# Patient Record
Sex: MALE | Race: WHITE | Employment: UNEMPLOYED | URBAN - NONMETROPOLITAN AREA
[De-identification: names, ages, dates, MRNs, and addresses within clinical notes are randomized per-mention and may not be internally consistent; named-entity substitution may affect disease eponyms.]

---

## 2021-10-12 PROCEDURE — U0004 COV-19 TEST NON-CDC HGH THRU: HCPCS | Performed by: NURSE PRACTITIONER

## 2021-10-21 ENCOUNTER — HOSPITAL ENCOUNTER (EMERGENCY)
Age: 51
Discharge: HOME | End: 2021-10-21
Attending: EMERGENCY MEDICINE
Payer: COMMERCIAL

## 2021-10-21 ENCOUNTER — APPOINTMENT (OUTPATIENT)
Dept: GENERAL RADIOLOGY | Age: 51
End: 2021-10-21
Payer: COMMERCIAL

## 2021-10-21 VITALS
BODY MASS INDEX: 26.32 KG/M2 | HEIGHT: 71 IN | RESPIRATION RATE: 17 BRPM | DIASTOLIC BLOOD PRESSURE: 78 MMHG | SYSTOLIC BLOOD PRESSURE: 118 MMHG | HEART RATE: 92 BPM | WEIGHT: 188 LBS | TEMPERATURE: 99.6 F | OXYGEN SATURATION: 97 %

## 2021-10-21 DIAGNOSIS — U07.1 COVID-19: Primary | ICD-10-CM

## 2021-10-21 LAB
ALBUMIN SERPL-MCNC: 3.7 G/DL (ref 3.5–5.2)
ALP BLD-CCNC: 77 U/L (ref 40–130)
ALT SERPL-CCNC: 71 U/L (ref 5–41)
ANION GAP SERPL CALCULATED.3IONS-SCNC: 9 MMOL/L (ref 7–19)
AST SERPL-CCNC: 42 U/L (ref 5–40)
BASOPHILS ABSOLUTE: 0 K/UL (ref 0–0.2)
BASOPHILS RELATIVE PERCENT: 0 % (ref 0–1)
BILIRUB SERPL-MCNC: 0.3 MG/DL (ref 0.2–1.2)
BUN BLDV-MCNC: 15 MG/DL (ref 6–20)
CALCIUM SERPL-MCNC: 8.9 MG/DL (ref 8.6–10)
CHLORIDE BLD-SCNC: 100 MMOL/L (ref 98–111)
CO2: 27 MMOL/L (ref 22–29)
CREAT SERPL-MCNC: 0.9 MG/DL (ref 0.5–1.2)
EOSINOPHILS ABSOLUTE: 0 K/UL (ref 0–0.6)
EOSINOPHILS RELATIVE PERCENT: 0 % (ref 0–5)
GFR AFRICAN AMERICAN: >59
GFR NON-AFRICAN AMERICAN: >60
GLUCOSE BLD-MCNC: 109 MG/DL (ref 74–109)
HCT VFR BLD CALC: 42.7 % (ref 42–52)
HEMOGLOBIN: 13.9 G/DL (ref 14–18)
IMMATURE GRANULOCYTES #: 0 K/UL
LYMPHOCYTES ABSOLUTE: 1.3 K/UL (ref 1.1–4.5)
LYMPHOCYTES RELATIVE PERCENT: 19.7 % (ref 20–40)
MCH RBC QN AUTO: 30.8 PG (ref 27–31)
MCHC RBC AUTO-ENTMCNC: 32.6 G/DL (ref 33–37)
MCV RBC AUTO: 94.7 FL (ref 80–94)
MONOCYTES ABSOLUTE: 0.4 K/UL (ref 0–0.9)
MONOCYTES RELATIVE PERCENT: 6.2 % (ref 0–10)
NEUTROPHILS ABSOLUTE: 4.9 K/UL (ref 1.5–7.5)
NEUTROPHILS RELATIVE PERCENT: 73.6 % (ref 50–65)
PDW BLD-RTO: 12.4 % (ref 11.5–14.5)
PLATELET # BLD: 151 K/UL (ref 130–400)
PMV BLD AUTO: 9.9 FL (ref 9.4–12.4)
POTASSIUM SERPL-SCNC: 3.7 MMOL/L (ref 3.5–5)
RBC # BLD: 4.51 M/UL (ref 4.7–6.1)
SODIUM BLD-SCNC: 136 MMOL/L (ref 136–145)
TOTAL PROTEIN: 6.7 G/DL (ref 6.6–8.7)
TROPONIN: <0.01 NG/ML (ref 0–0.03)
WBC # BLD: 6.6 K/UL (ref 4.8–10.8)

## 2021-10-21 PROCEDURE — 2500000003 HC RX 250 WO HCPCS: Performed by: EMERGENCY MEDICINE

## 2021-10-21 PROCEDURE — 80053 COMPREHEN METABOLIC PANEL: CPT

## 2021-10-21 PROCEDURE — 93005 ELECTROCARDIOGRAM TRACING: CPT

## 2021-10-21 PROCEDURE — 71045 X-RAY EXAM CHEST 1 VIEW: CPT

## 2021-10-21 PROCEDURE — 2580000003 HC RX 258: Performed by: EMERGENCY MEDICINE

## 2021-10-21 PROCEDURE — 96365 THER/PROPH/DIAG IV INF INIT: CPT

## 2021-10-21 PROCEDURE — 36415 COLL VENOUS BLD VENIPUNCTURE: CPT

## 2021-10-21 PROCEDURE — 99282 EMERGENCY DEPT VISIT SF MDM: CPT

## 2021-10-21 PROCEDURE — 85025 COMPLETE CBC W/AUTO DIFF WBC: CPT

## 2021-10-21 PROCEDURE — 84484 ASSAY OF TROPONIN QUANT: CPT

## 2021-10-21 RX ORDER — ALBUTEROL SULFATE 90 UG/1
2 AEROSOL, METERED RESPIRATORY (INHALATION) 4 TIMES DAILY PRN
Qty: 18 G | Refills: 0 | Status: SHIPPED | OUTPATIENT
Start: 2021-10-21

## 2021-10-21 RX ORDER — BENZONATATE 100 MG/1
100 CAPSULE ORAL 3 TIMES DAILY PRN
Qty: 30 CAPSULE | Refills: 0 | Status: SHIPPED | OUTPATIENT
Start: 2021-10-21 | End: 2021-10-28

## 2021-10-21 RX ADMIN — IMDEVIMAB: 1332 INJECTION, SOLUTION, CONCENTRATE INTRAVENOUS at 09:29

## 2021-10-21 ASSESSMENT — ENCOUNTER SYMPTOMS
BACK PAIN: 0
SHORTNESS OF BREATH: 1
SORE THROAT: 0
VOMITING: 0
RHINORRHEA: 0
COUGH: 1
NAUSEA: 0
ABDOMINAL PAIN: 0
DIARRHEA: 0

## 2021-10-21 ASSESSMENT — PAIN DESCRIPTION - LOCATION: LOCATION: HEAD

## 2021-10-21 ASSESSMENT — PAIN SCALES - GENERAL: PAINLEVEL_OUTOF10: 3

## 2021-10-21 ASSESSMENT — PAIN DESCRIPTION - DESCRIPTORS: DESCRIPTORS: PATIENT UNABLE TO DESCRIBE

## 2021-10-21 NOTE — ED NOTES
Bed: 06  Expected date:   Expected time:   Means of arrival:   Comments:  Terminal      Reno Boyle  10/21/21 0759

## 2021-10-21 NOTE — ED PROVIDER NOTES
[facility] EMERGENCY DEPT  eMERGENCY dEPARTMENT eNCOUnter      Pt Name: Giovanna Steele  MRN: [id number]  [place] 1970  Date of evaluation: 10/21/2021  Provider: Zehra Corona MD    CHIEF COMPLAINT       Chief Complaint   Patient presents with    Positive For Covid-19     since last Tues    Shortness of Breath         HISTORY OF PRESENT ILLNESS   (Location/Symptom, Timing/Onset,Context/Setting, Quality, Duration, Modifying Factors, Severity)  Note limiting factors. Giovanna Steele is a 49 y.o. male who presents to the emergency department for COVID-19. Patient is on day 9 of symptoms and tested positive on October 12. He initially developed a sore throat and had a live telehealth visit and was placed on some cough medicine and steroid. He states overall he been doing well and over the last day has felt his cough is worsened and felt little short of breath. He has a home pulse oximeter and it was dropping into the low 90s and once he saw 89. He denies any chest pain. He is otherwise healthy. HPI    NursingNotes were reviewed. REVIEW OF SYSTEMS    (2-9 systems for level 4, 10 or more for level 5)     Review of Systems   Constitutional: Positive for fatigue. Negative for chills and fever. HENT: Negative for rhinorrhea and sore throat. Respiratory: Positive for cough and shortness of breath. Cardiovascular: Negative for chest pain and leg swelling. Gastrointestinal: Negative for abdominal pain, diarrhea, nausea and vomiting. Genitourinary: Negative for dysuria and frequency. Musculoskeletal: Negative for back pain and neck pain. Neurological: Negative for dizziness and headaches. All other systems reviewed and are negative. PAST MEDICALHISTORY   History reviewed. No pertinent past medical history.       SURGICAL HISTORY       Past Surgical History:   Procedure Laterality Date    ANTERIOR CRUCIATE LIGAMENT REPAIR Right          CURRENT MEDICATIONS     Previous Medications PREDNISONE PO    Take by mouth pack       ALLERGIES     Patient has no allergy information on record. FAMILY HISTORY     History reviewed. No pertinent family history. SOCIAL HISTORY       Social History     Socioeconomic History    Marital status:      Spouse name: None    Number of children: None    Years of education: None    Highest education level: None   Occupational History    None   Tobacco Use    Smoking status: Never Smoker    Smokeless tobacco: Never Used   Substance and Sexual Activity    Alcohol use: Yes     Comment: rare    Drug use: Not Currently    Sexual activity: None   Other Topics Concern    None   Social History Narrative    None     Social Determinants of Health     Financial Resource Strain:     Difficulty of Paying Living Expenses:    Food Insecurity:     Worried About Running Out of Food in the Last Year:     Ran Out of Food in the Last Year:    Transportation Needs:     Lack of Transportation (Medical):  Lack of Transportation (Non-Medical):    Physical Activity:     Days of Exercise per Week:     Minutes of Exercise per Session:    Stress:     Feeling of Stress :    Social Connections:     Frequency of Communication with Friends and Family:     Frequency of Social Gatherings with Friends and Family:     Attends Rastafarian Services:     Active Member of Clubs or Organizations:     Attends Club or Organization Meetings:     Marital Status:    Intimate Partner Violence:     Fear of Current or Ex-Partner:     Emotionally Abused:     Physically Abused:     Sexually Abused:        SCREENINGS             PHYSICAL EXAM    (up to 7 for level 4, 8 or more for level 5)     ED Triage Vitals [10/21/21 0807]   BP Temp Temp Source Pulse Resp SpO2 Height Weight   118/78 99.6 °F (37.6 °C) Oral 92 17 97 % 5' 11\" (1.803 m) 188 lb (85.3 kg)       Physical Exam  Vitals and nursing note reviewed. Constitutional:       Appearance: He is well-developed.  He is not ill-appearing or diaphoretic. Comments: Mild dry cough while we are talking, speaking in full sentences in no distress   HENT:      Head: Normocephalic and atraumatic. Nose: Nose normal.      Mouth/Throat:      Mouth: Mucous membranes are moist.   Eyes:      Conjunctiva/sclera: Conjunctivae normal.   Neck:      Trachea: No tracheal deviation. Cardiovascular:      Rate and Rhythm: Normal rate and regular rhythm. Heart sounds: Normal heart sounds. No murmur heard. Pulmonary:      Breath sounds: Normal breath sounds. No wheezing or rales. Abdominal:      Palpations: Abdomen is soft. There is no mass. Tenderness: There is no abdominal tenderness. Musculoskeletal:         General: Normal range of motion. Cervical back: Normal range of motion and neck supple. Right lower leg: No edema. Left lower leg: No edema. Skin:     General: Skin is warm and dry. Neurological:      Mental Status: He is alert and oriented to person, place, and time. DIAGNOSTIC RESULTS     EKG: All EKG's areinterpreted by the Emergency Department Physician who either signs or Co-signs this chart in the absence of a cardiologist.    89 normal sinus rhythm no obvious ST changes nondiagnostic EKG    RADIOLOGY:  Non-plain film images such as CT, Ultrasound and MRI are read by the radiologist. Plain radiographic images are visualized and preliminarily interpreted bythe emergency physician with the below findings:      XR CHEST PORTABLE   Final Result   Impression:   Findings are most concerning for infection.    Signed by Dr Cosme Zuniga:  Labs Reviewed   CBC WITH AUTO DIFFERENTIAL - Abnormal; Notable for the following components:       Result Value    RBC 4.51 (*)     Hemoglobin 13.9 (*)     MCV 94.7 (*)     MCHC 32.6 (*)     Neutrophils % 73.6 (*)     Lymphocytes % 19.7 (*)     All other components within normal limits   COMPREHENSIVE METABOLIC PANEL - Abnormal; Notable for the following components:    ALT 71 (*)     AST 42 (*)     All other components within normal limits   TROPONIN       All other labs were within normal range or not returned as of this dictation.     EMERGENCY DEPARTMENT COURSE and DIFFERENTIAL DIAGNOSIS/MDM:   Vitals:    Vitals:    10/21/21 0807   BP: 118/78   Pulse: 92   Resp: 17   Temp: 99.6 °F (37.6 °C)   TempSrc: Oral   SpO2: 97%   Weight: 188 lb (85.3 kg)   Height: 5' 11\" (1.803 m)       MDM  Number of Diagnoses or Management Options     Amount and/or Complexity of Data Reviewed  Clinical lab tests: ordered and reviewed  Tests in the radiology section of CPT®: ordered and reviewed  Independent visualization of images, tracings, or specimens: yes      Pt well appearing here, conversive speaks in full sentences etc, sat 94-97% while here, cxr with some pneumonia, labs reassuring, giving mab infusion here, discussed ongoing monitoring at home as well as strict return precautions, pt has pulse oximeter, cough med and inhaler already          CONSULTS:  IP CONSULT TO PHARMACY    PROCEDURES:  Unless otherwise noted below, none     Procedures    FINAL IMPRESSION      1. COVID-19          DISPOSITION/PLAN   DISPOSITION Discharge - Pending Orders Complete 10/21/2021 09:41:50 AM      PATIENT REFERRED TO:  [facility]  Shayne Lakhani  [phone]    As needed, If symptoms worsen      DISCHARGE MEDICATIONS:  New Prescriptions    No medications on file          (Please note that portions of this note were completed with a voice recognition program.  Efforts were made to edit thedictations but occasionally words are mis-transcribed.)    Dafne Phan MD (electronically signed)  Attending Emergency Physician        Sabina Delcid MD  10/21/21 2035

## 2021-10-25 LAB
EKG P AXIS: 33 DEGREES
EKG P-R INTERVAL: 152 MS
EKG Q-T INTERVAL: 340 MS
EKG QRS DURATION: 92 MS
EKG QTC CALCULATION (BAZETT): 389 MS
EKG T AXIS: 49 DEGREES